# Patient Record
Sex: FEMALE | Race: ASIAN | NOT HISPANIC OR LATINO | URBAN - METROPOLITAN AREA
[De-identification: names, ages, dates, MRNs, and addresses within clinical notes are randomized per-mention and may not be internally consistent; named-entity substitution may affect disease eponyms.]

---

## 2017-11-27 ENCOUNTER — ALLSCRIPTS OFFICE VISIT (OUTPATIENT)
Dept: OTHER | Facility: OTHER | Age: 37
End: 2017-11-27

## 2017-11-28 ENCOUNTER — LAB CONVERSION - ENCOUNTER (OUTPATIENT)
Dept: OTHER | Facility: OTHER | Age: 37
End: 2017-11-28

## 2017-11-28 LAB
CONTAINER TYP (HISTORICAL): NORMAL
FINAL RESOLUTION (HISTORICAL): NORMAL
QUESTION/PROBLEM (HISTORICAL): NORMAL
SPECIMEN STATUS REPORT (HISTORICAL): NORMAL

## 2017-11-28 NOTE — PROGRESS NOTES
Assessment    1  Encounter for well woman exam with routine gynecological exam (V72 31) (Z01 419)   2  IUD check up (V25 42) (Z30 479)    Plan  Encounter for well woman exam with routine gynecological exam    · (Q) THINPREP TIS PAP AND HPV mRNA E6/E7 REFLEX HPV 16,18/45; Status:Active -Retrospective By Protocol Authorization; Requested for:27Nov2017;    Perform:[facility]; EVAZD:[id number]; Last Updated Toni José Miguel; 11/27/2017 10:50:22 AM;Ordered;for well woman exam with routine gynecological exam; Ordered By:Heena Ross;   · Call [phone] if: You find a new or different kind of lump in your breast ;Status:Complete;   Done: 08LNQ0502 12:48PM   Ordered;for well woman exam with routine gynecological exam; Ordered By:Heena Ross;   · Always use a seat belt and shoulder strap when riding or driving a motor vehicle  ;Status:Complete;   Done: 27IHR6761 12:48PM   Ordered;For:Encounter for well woman exam with routine gynecological exam; Ordered By:Heena Ross;   · Begin a limited exercise program ; Status:Complete;   Done: 47VON6712 12:48PM   Ordered;for well woman exam with routine gynecological exam; Ordered By:Heena Ross;   · Begin or continue regular aerobic exercise   Gradually work up to at least 3 sessions of 30minutes of exercise a week ; Status:Complete;   Done: 62WTR3572 12:48PM   Ordered;for well woman exam with routine gynecological exam; Ordered By:Heena Ross;   · Brush your teeth 3 times a day and floss at least once a day ; Status:Complete;   Done:27Nov2017 12:48PM   Ordered;For:Encounter for well woman exam with routine gynecological exam; Ordered By:Heena Ross;   · Keep a diary of when and what you eat ; Status:Complete;   Done: 57BHY1941 12:48PM   Ordered;for well woman exam with routine gynecological exam; Ordered By:Heena Ross;   · Limit your use of alcohol to 2 drinks or cans of beer a day ; Status:Complete;   Done:27Nov2017 12: 48PM   Ordered;for well woman exam with routine gynecological exam; Ordered By:Heena Ross;   · Some eating tips that can help you lose weight ; Status:Complete;   Done: 46UZB820834:88AE   Ordered;for well woman exam with routine gynecological exam; Ordered By:Heena Ross;   · Use a sun block product with an SPF of 15 or more ; Status:Complete;   Done:66Ugr0148 12:48PM   Ordered;for well woman exam with routine gynecological exam; Ordered By:Heena Ross;   · Vitamins can help you get daily requirements that your diet may not be giving you  ;Status:Complete;   Done: 91RHZ6898 12:48PM   Ordered;for well woman exam with routine gynecological exam; Ordered By:Heena Ross;   · We encourage all of our patients to exercise regularly  30 minutes of exercise or physicalactivity five or more days a week is recommended for children and adults  ;Status:Complete;   Done: 16JMR1588 12:48PM   Ordered;for well woman exam with routine gynecological exam; Ordered By:Heena Ross;   · We recommend routine visits to a dentist ; Status:Complete;   Done: 35RDC992553:95CD   Ordered;for well woman exam with routine gynecological exam; Ordered By:Heena Ross;   · We recommend that you bring your body mass index down to 26 ; Status:Complete;  Done: 34NTO8834 12:48PM   Ordered;for well woman exam with routine gynecological exam; Ordered By:Heena Ross;   · We recommend that you follow these steps to lower your risk of osteoporosis  ;Status:Complete;   Done: 12JZP5987 12:48PM   Ordered;for well woman exam with routine gynecological exam; Ordered By:Heena Ross;   · Follow-up visit in 1 year Evaluation and Treatment  Follow-up  Status: Hold For -Scheduling  Requested for: 51GBD9120   Ordered;Encounter for well woman exam with routine gynecological exam; Ordered By: Tai Avitia Performed:  Due: 97QKZ2373    Discussion/Summary  healthy adult female Currently, she eats an adequate diet  the risks and benefits of cervical cancer screening were discussed cervical cancer screening is needed every three years HPV and Pap Co-testing Done Today Breast cancer screening: the risks and benefits of breast cancer screening were discussed and monthly self breast exam was advised  Advice and education were given regarding nutrition, aerobic exercise, reproductive health and contraception  Patient discussion: discussed with the patient  Return to office for intrauterine device swap  Take ibuprofen prior to appointment  Will place progesterone containing intrauterine device to help improve heavy menses  Patient is able to Self-Care  Possible side effects of new medications were reviewed with the patient/guardian today  The treatment plan was reviewed with the patient/guardian  The patient/guardian understands and agrees with the treatment plan      Chief Complaint  Pt here for yearly exam, would like to discuss replacing IUD  History of Present Illness  GYN HM, Adult Female [facility]: The patient is being seen for a health maintenance evaluation  The last health maintenance visit was 3 year(s) ago  General Health:  Lifestyle:  She consumes a diverse and healthy diet  -- She has weight concerns  -- She does not exercise regularly  -- She does not use tobacco -- She denies alcohol use  -- She denies drug use  Reproductive health: the patient is premenopausal--   she reports no menstrual problems  -- she uses contraception  -- she is sexually active  -- pregnancy history: G 3P 3,-- 3  Screening: Additional History:  Appointment done through Mandarin  on the phone   has no complaints  She is due to have her ParaGard replaced  She states she does have a discharge throughout the month and her cycles are heavy  We discussed that this may be due to the [address] [facility]   We discussed other options for IUDs, such as progesterone containing intrauterine device that may help decrease bleeding  Patient is interested in having a progesterone intrauterine device placed when the copper intrauterine device is removed  Will schedule the intrauterine device swap in the near future  Review of Systems   Constitutional: No fever, no chills, feels well, no tiredness, no recent weight gain or loss  ENT: no ear ache, no loss of hearing, no nosebleeds or nasal discharge, no sore throat or hoarseness  Cardiovascular: no complaints of slow or fast heart rate, no chest pain, no palpitations, no leg claudication or lower extremity edema  Respiratory: no complaints of shortness of breath, no wheezing, no dyspnea on exertion, no orthopnea or PND  Breasts: no complaints of breast pain, breast lump or nipple discharge  Gastrointestinal: no complaints of abdominal pain, no constipation, no nausea or diarrhea, no vomiting, no bloody stools  Genitourinary: no complaints of dysuria, no incontinence, no pelvic pain, no dysmenorrhea, no vaginal discharge or abnormal vaginal bleeding  Musculoskeletal: no complaints of arthralgia, no myalgia, no joint swelling or stiffness, no limb pain or swelling  Integumentary: no complaints of skin rash or lesion, no itching or dry skin, no skin wounds  Neurological: no complaints of headache, no confusion, no numbness or tingling, no dizziness or fainting  ROS reviewed  OB History  Pregnancy History (Brief):  Prior pregnancies: : 3  Para: 3 (full-term)-- and-- 3 (living)  Delivery type: 3 vaginal       Active Problems  1  Encounter for well woman exam with routine gynecological exam () (Z01 419)    Past Medical History    The active problems and past medical history were reviewed and updated today  Surgical History   · Denied: History Of Prior Surgery    The surgical history was reviewed and updated today         Family History  Mother    · No pertinent family history  Father    · No pertinent family history    The family history was reviewed and updated today  Social History   · Denied: History of Alcohol use   · Denied: History of Drug use   · Never a smoker  The social history was reviewed and updated today  Allergies  1  No Known Drug Allergies  2  No Known Food Allergies   3  Pollen    Vitals   Recorded: 22RFY9171 10:33AM   Heart Rate 64   Systolic 400, Standing   Diastolic 64, Standing   Height 5 ft 1 75 in   Weight 135 lb 8 oz   BMI Calculated 24 99   BSA Calculated 1 62   LMP 59Qnw1426   Pain Scale 0       Physical Exam   Constitutional  General appearance: No acute distress, well appearing and well nourished  Neck  Neck: Normal, supple, trachea midline, no masses  Thyroid: Normal, no thyromegaly  Pulmonary  Respiratory effort: No increased work of breathing or signs of respiratory distress  Auscultation of lungs: Clear to auscultation  Cardiovascular  Auscultation of heart: Normal rate and rhythm, normal S1 and S2, no murmurs  Genitourinary  External genitalia: Normal and no lesions appreciated  Vagina: Normal, no lesions or dryness appreciated  Urethra: Normal    Urethral meatus: Normal    Bladder: Normal, soft, non-tender and no prolapse or masses appreciated  Cervix: Normal, no palpable masses  -- IUD strings not visualized  Uterus: Normal, non-tender, not enlarged, and no palpable masses  Adnexa/parametria: Normal, non-tender and no fullness or masses appreciated  Chest  Breasts: Normal and no dimpling or skin changes noted  -- no palpable masses  Abdomen  Abdomen: Normal, non-tender, and no organomegaly noted  Liver and spleen: No hepatomegaly or splenomegaly  Lymphatic  Palpation of lymph nodes in neck, axillae, groin and/or other locations: No lymphadenopathy or masses noted     Psychiatric  Orientation to person, place, and time: Normal    Mood and affect: Normal        Future Appointments    Date/Time Provider Specialty Site   92/36/0207 70:72 [facility] [address]       Signatures   Electronically signed by : Susan Jay DO; Nov 27 7817 12:49PM EST                       (Author)

## 2017-11-30 ENCOUNTER — LAB CONVERSION - ENCOUNTER (OUTPATIENT)
Dept: OTHER | Facility: OTHER | Age: 37
End: 2017-11-30

## 2017-11-30 LAB
ADDITIONAL INFORMATION (HISTORICAL): NORMAL
ADEQUACY: (HISTORICAL): NORMAL
COMMENT (HISTORICAL): NORMAL
CONTAINER TYP (HISTORICAL): NORMAL
CYTOTECHNOLOGIST: (HISTORICAL): NORMAL
FINAL RESOLUTION (HISTORICAL): NORMAL
HPV MRNA E6/E7 (HISTORICAL): NOT DETECTED
INTERPRETATION (HISTORICAL): NORMAL
LMP (HISTORICAL): NORMAL
PREV. BX: (HISTORICAL): NORMAL
PREV. PAP (HISTORICAL): NORMAL
QUESTION/PROBLEM (HISTORICAL): NORMAL
REVIEWED BY (HISTORICAL): NORMAL
SOURCE (HISTORICAL): NORMAL
SPECIMEN STATUS REPORT (HISTORICAL): NORMAL

## 2017-12-01 ENCOUNTER — GENERIC CONVERSION - ENCOUNTER (OUTPATIENT)
Dept: OTHER | Facility: OTHER | Age: 37
End: 2017-12-01

## 2018-01-15 VITALS
HEIGHT: 62 IN | WEIGHT: 135.5 LBS | BODY MASS INDEX: 24.93 KG/M2 | HEART RATE: 64 BPM | SYSTOLIC BLOOD PRESSURE: 108 MMHG | DIASTOLIC BLOOD PRESSURE: 64 MMHG

## 2018-01-22 ENCOUNTER — ALLSCRIPTS OFFICE VISIT (OUTPATIENT)
Dept: OTHER | Facility: OTHER | Age: 38
End: 2018-01-22

## 2018-01-22 LAB — HCG, QUALITATIVE (HISTORICAL): NEGATIVE

## 2018-01-23 NOTE — MISCELLANEOUS
Dear Mckenzie Colindres,  I am happy to report that your Pap test collected during your recent exam  was normal  The HPV test was negative  Based on the most recent guidelines, you will be due for your next Pap test and HPV testing in 3 years  However, I will continue to see you annually for your Well Women visit  If you have any questions, please  do not hesitate to contact my office  Sincerely,    Heena MURO   [address] [place] [address], DO

## 2018-01-23 NOTE — RESULT NOTES
Verified Results  (Q) THINPREP TIS PAP AND HPV mRNA E6/E7 REFLEX HPV 16,18/45 [id number] 85:92AN Brigid Ross     Test Name Result Flag Reference   CLINICAL INFORMATION: NONE GIVEN     LMP: NONE GIVEN     PREV  PAP: NONE GIVEN     PREV  BX: NONE GIVEN     SOURCE: ENDOCERVIX     STATEMENT OF ADEQUACY:      Satisfactory for evaluation  Endocervical/transformation zone component  present  Age and/or menstrual status not provided   INTERPRETATION/RESULT:      Negative for intraepithelial lesion or malignancy  COMMENT:      This Pap test has been evaluated with computer  assisted technology  CYTOTECHNOLOGIST:      ANDI KLEIN(ASCP)  CT screening location: [address] [place], [place] [address]   HPV mRNA E6/E7 Not Detected  Not Detected   This test was performed using the APTIMA HPV Assay ([employer] )  This assay detects E6/E7 viral messenger RNA (mRNA) from 14  high-risk HPV types (16,18,31,33,35,39,45,51,52,56,58,59,66,68)     REVIEW CYTOTECHNOLOGIST:      [place], [place](ASCP)  CT screening location:[address] [id number] 74:70OC Ranjan Mac     Test Name Result Flag Reference   STATUS FINAL     CONTAINER TYPE: THIN PREP     QUESTION/PROBLEM CLARIFY CLINICAL     FINAL RESOLUTION PROCESS ECX

## 2018-01-23 NOTE — PROCEDURES
Assessment   1  Encounter for IUD removal and reinsertion (V25 13) (Z30 433)   2  Encounter for insertion of intrauterine contraceptive device (IUD) (V25 11) (Z30 430)    Plan   Encounter for insertion of intrauterine contraceptive device (IUD)    · Mirena (52 MG) 20 MCG/24HR Intrauterine Intrauterine Device   Rx By: Laney Libman; For: Encounter for insertion of intrauterine contraceptive device (IUD); Dose of 52 mg; Intrauterine; KRISTI = N; Administered: 1/22/2018 3:32:00 PM   · Urine HCG- POC; Status:Complete;   Done: 25BQL1061 03:55PM   Performed: In Office; [phone]; Ordered; Today;For:Encounter for insertion of intrauterine contraceptive device (IUD); Ordered By:Pasha Ross;    Discussion/Summary   Discussion Summary:    Intrauterine device replaced  profile reviewed    Should get shorter and lighter as time goes on  to office in 4 weeks for intrauterine device check  Medication SE Review and Pt Understands Tx: Possible side effects of new medications were reviewed with the patient/guardian today  The treatment plan was reviewed with the patient/guardian  The patient/guardian understands and agrees with the treatment plan      Chief Complaint   Chief Complaint: Chief Complaint:      The patient presents to the office today with Intrauterine device removal and reinsertion  Appointment done with  on the line  Active Problems   1  Encounter for insertion of intrauterine contraceptive device (IUD) (V25 11) (Z30 430)   2  Encounter for well woman exam with routine gynecological exam (V72 31) (Z01 419)   3  IUD check up (V25 42) (Z30 431)    Current Meds   1  Levothyroxine Sodium 75 MCG Oral Tablet; Therapy: 96LDW1429 to Recorded   2  Mirena IUD; Therapy: (Recorded:47Ysp8546) to Recorded    Allergies   1  No Known Drug Allergies  2  No Known Food Allergies   3  Pollen    Procedure        Procedure: intrauterine device (IUD) placement        Risks, benefits and alternatives were discussed with the patient  We discussed possible complications and risks, including infection,-- bleeding,-- pelvic pain,-- expulsion,-- failure,-- uterine perforation-- and-- increased risk of ectopic should pregnancy occur  a pregnancy test was performed and confirmed negative  Procedure Note:      Anesthesia: none  The cervix was prepped with betadine  The cervix was stabilized with a single toothed tenaculum  The cervix allowed easy passage of a uterine sound without dilation  The uterus was in mid position-- and-- sounded to 7 cm  The Mirena IUD was gently inserted to the fundus of the uterus using standard technique  IUD lot number: [id number]  Post-Procedure:      Patient Status: the patient tolerated the procedure well  Complications: there were no complications  Follow up: return for follow up visit in 1-2 months  Procedure: removal of an unknown IUD  Indications for the procedure include dysfunctional uterine bleeding  Risks, benefits and alternatives were discussed with the patient  Consent was obtained prior to the procedure and is detailed in the patient's record  Procedure Note:   a speculum was placed in the vagina,-- the IUD strings were not visible-- and-- the strings could not be visualized  An IUD hook was placed into the uterus and the IUD was ensnared and removed  The IUD was discarded  Post-Procedure:    the patient tolerated the procedure well  Complications: none  Follow-up in the office in 4 week(s)        Signatures    Electronically signed by : Garrett Thompson DO; Jan 22 1749  5:29PM EST                       (Author)

## 2018-02-23 ENCOUNTER — OFFICE VISIT (OUTPATIENT)
Dept: OBGYN CLINIC | Facility: CLINIC | Age: 38
End: 2018-02-23
Payer: COMMERCIAL

## 2018-02-23 VITALS — BODY MASS INDEX: 25.26 KG/M2 | SYSTOLIC BLOOD PRESSURE: 102 MMHG | DIASTOLIC BLOOD PRESSURE: 62 MMHG | WEIGHT: 137 LBS

## 2018-02-23 DIAGNOSIS — Z30.431 IUD CHECK UP: Primary | ICD-10-CM

## 2018-02-23 PROCEDURE — 99213 OFFICE O/P EST LOW 20 MIN: CPT | Performed by: OBSTETRICS & GYNECOLOGY

## 2018-02-23 RX ORDER — LEVOTHYROXINE SODIUM 0.07 MG/1
75 TABLET ORAL DAILY
Refills: 1 | COMMUNITY
Start: 2018-02-04

## 2018-02-23 NOTE — PROGRESS NOTES
Assessment/Plan     Diagnoses and all orders for this visit:    IUD check up    Other orders  -     levothyroxine 75 mcg tablet; Take 75 mcg by mouth daily  -     levonorgestrel (MIRENA, 52 MG,) 20 MCG/24HR IUD; by Intrauterine route            Return to office for annual exam or as needed  Subjective   Keron Jj is an 45 y o  woman who presents for IUD string check  Patient has the following IUD: Mirena  She reports no complaints  Pt has not been able to feel strings  Pt does not report bleeding issues  Pt does not report pain issues  She had a period that was very light  There is no problem list on file for this patient  Menstrual History:  OB History      Para Term  AB Living    3 3 3   0 3    SAB TAB Ectopic Multiple Live Births            3           No LMP recorded  Patient is not currently having periods (Reason: Birth Control)  Past Medical History:   Diagnosis Date    Disease of thyroid gland        Review of Systems  Constitutional: no fever, feels well  Breasts: no complaints of breast pain  Gastrointestinal: no complaints of abdominal pain, constipation,nausea, vomiting  Genitourinary: no complaints of  pelvic pain, dysmenorrhea, vaginal discharge or abnormal vaginal bleeding except as noted in HPI  Neurological: no complaints of headache  Objective    /62   Wt 62 1 kg (137 lb)   BMI 25 26 kg/m²     General: alert and oriented, in no acute distress  Vulva: normal  Vagina:normal mucosa  Cervix: no lesions  IUD strings were visualized

## 2018-02-23 NOTE — PATIENT INSTRUCTIONS
Intrauterine Device   WHAT YOU NEED TO KNOW:   An intrauterine device (IUD) is a type of birth control that is inserted into your uterus  It is a small, flexible piece of plastic with a string on the end  It is inserted and removed by your healthcare provider  IUDs prevent sperm from reaching or fertilizing an egg  IUDs also prevent a fertilized egg from attaching to the uterus and developing into a fetus  DISCHARGE INSTRUCTIONS:   Make sure your IUD is in place: An IUD has a string that is made of plastic thread  One to 2 inches of this string hangs into your vagina  You cannot see this string, and it will not cause problems when you have sex  Check your IUD string every 3 days for the first 3 months that you have your IUD  After that, check the string after each monthly period  Do the following to check the placement of your IUD:  · Wash your hands with soap and warm water  Dry them with a clean towel  · Bend your knees and squat low to the ground  · Gently put your index finger inside your vagina  The cervix is at the top of the vagina and feels like the tip of your nose  Feel for the IUD string  Do not pull on the string  You should not be able to feel the firm plastic of the IUD itself  · Wash your hands after you check your IUD string  For more information:   · [employer] [facility] [address] , [address] [place]  Phone: 6- 190 - [phone]  Web Address: [email or web address]/  org  Follow up with your healthcare provider as directed:  Write down your questions so you remember to ask them during your visits  Contact your healthcare provider if:   · You think you are pregnant  · You bleed after you have sex  · You have pain during sex  · You cannot feel the IUD string, the string feels longer, or you feel the plastic of the IUD itself  · You have vaginal discharge that is green, yellow, or has a foul odor      · You have questions or concerns about your condition or care  Seek care immediately if:   · You have severe pain or bleeding during your period  · You have a fever and severe abdominal pain  © 2017 [address] [employer] Information is for End User's use only and may not be sold, redistributed or otherwise used for commercial purposes  All illustrations and images included in CareNotes® are the copyrighted property of [employer]  or [facility]  The above information is an  only  It is not intended as medical advice for individual conditions or treatments  Talk to your doctor, nurse or pharmacist before following any medical regimen to see if it is safe and effective for you

## 2018-09-04 ENCOUNTER — TELEPHONE (OUTPATIENT)
Dept: OBGYN CLINIC | Facility: CLINIC | Age: 38
End: 2018-09-04

## 2018-09-04 NOTE — TELEPHONE ENCOUNTER
Daughter calling for mother states mother has Mirena IUD  She had it replaced in January  She is currently not getting any menses  She thought she would have a light menses  Advised this can be normal  She denies any pain or discomfort  Routing to provider for further recommendations